# Patient Record
Sex: MALE | Race: OTHER | NOT HISPANIC OR LATINO | URBAN - METROPOLITAN AREA
[De-identification: names, ages, dates, MRNs, and addresses within clinical notes are randomized per-mention and may not be internally consistent; named-entity substitution may affect disease eponyms.]

---

## 2019-01-03 ENCOUNTER — EMERGENCY (EMERGENCY)
Facility: HOSPITAL | Age: 29
LOS: 1 days | Discharge: HOME | End: 2019-01-03
Admitting: EMERGENCY MEDICINE
Payer: COMMERCIAL

## 2019-01-03 VITALS
SYSTOLIC BLOOD PRESSURE: 105 MMHG | OXYGEN SATURATION: 98 % | DIASTOLIC BLOOD PRESSURE: 68 MMHG | WEIGHT: 210.1 LBS | HEART RATE: 88 BPM | RESPIRATION RATE: 18 BRPM | TEMPERATURE: 100 F

## 2019-01-03 VITALS
RESPIRATION RATE: 18 BRPM | SYSTOLIC BLOOD PRESSURE: 104 MMHG | OXYGEN SATURATION: 98 % | HEART RATE: 82 BPM | DIASTOLIC BLOOD PRESSURE: 59 MMHG

## 2019-01-03 PROCEDURE — 70450 CT HEAD/BRAIN W/O DYE: CPT

## 2019-01-03 PROCEDURE — 70450 CT HEAD/BRAIN W/O DYE: CPT | Mod: 26

## 2019-01-03 PROCEDURE — 99284 EMERGENCY DEPT VISIT MOD MDM: CPT

## 2019-01-03 PROCEDURE — 99284 EMERGENCY DEPT VISIT MOD MDM: CPT | Mod: 25

## 2019-01-03 PROCEDURE — 96374 THER/PROPH/DIAG INJ IV PUSH: CPT

## 2019-01-03 PROCEDURE — 96361 HYDRATE IV INFUSION ADD-ON: CPT

## 2019-01-03 PROCEDURE — 96375 TX/PRO/DX INJ NEW DRUG ADDON: CPT

## 2019-01-03 RX ORDER — SODIUM CHLORIDE 9 MG/ML
1000 INJECTION INTRAMUSCULAR; INTRAVENOUS; SUBCUTANEOUS ONCE
Qty: 0 | Refills: 0 | Status: COMPLETED | OUTPATIENT
Start: 2019-01-03 | End: 2019-01-03

## 2019-01-03 RX ORDER — MAGNESIUM SULFATE 500 MG/ML
2 VIAL (ML) INJECTION ONCE
Qty: 0 | Refills: 0 | Status: COMPLETED | OUTPATIENT
Start: 2019-01-03 | End: 2019-01-03

## 2019-01-03 RX ORDER — KETOROLAC TROMETHAMINE 30 MG/ML
30 SYRINGE (ML) INJECTION ONCE
Qty: 0 | Refills: 0 | Status: DISCONTINUED | OUTPATIENT
Start: 2019-01-03 | End: 2019-01-03

## 2019-01-03 RX ORDER — METOCLOPRAMIDE HCL 10 MG
10 TABLET ORAL ONCE
Qty: 0 | Refills: 0 | Status: COMPLETED | OUTPATIENT
Start: 2019-01-03 | End: 2019-01-03

## 2019-01-03 RX ADMIN — Medication 30 MILLIGRAM(S): at 14:36

## 2019-01-03 RX ADMIN — Medication 10 MILLIGRAM(S): at 14:36

## 2019-01-03 RX ADMIN — Medication 50 GRAM(S): at 15:53

## 2019-01-03 RX ADMIN — Medication 30 MILLIGRAM(S): at 15:24

## 2019-01-03 RX ADMIN — SODIUM CHLORIDE 2000 MILLILITER(S): 9 INJECTION INTRAMUSCULAR; INTRAVENOUS; SUBCUTANEOUS at 14:36

## 2019-01-03 RX ADMIN — SODIUM CHLORIDE 1000 MILLILITER(S): 9 INJECTION INTRAMUSCULAR; INTRAVENOUS; SUBCUTANEOUS at 15:24

## 2019-01-03 NOTE — ED PROVIDER NOTE - NSFOLLOWUPINSTRUCTIONS_ED_ALL_ED_FT
Follow up with neurology in one week and follow up with your primary care physician this week. motrin as needed for headaches.

## 2019-01-03 NOTE — ED ADULT NURSE NOTE - OBJECTIVE STATEMENT
Pt w/o significant PMH presents to ED today c/o 8/10 HA since yesterday.  Pt also elicits photophobia.  Pt denies fall or LOC.  Pt is PERRLA, EOMI and w/o neuro deficit on exam.  Pt ambulates w/ steady.  Pt is pending pain relief and eval by [facility].

## 2019-01-03 NOTE — ED ADULT NURSE NOTE - CHPI ED NUR SYMPTOMS NEG
no confusion/no weakness/no loss of consciousness/no dizziness/no fever/no numbness/no vomiting/no blurred vision/no nausea/no change in level of consciousness

## 2019-01-03 NOTE — ED ADULT NURSE NOTE - NSIMPLEMENTINTERV_GEN_ALL_ED
Implemented All Universal Safety Interventions:  [place] to call system. Call bell, personal items and telephone within reach. Instruct patient to call for assistance. Room bathroom lighting operational. Non-slip footwear when patient is off stretcher. Physically safe environment: no spills, clutter or unnecessary equipment. Stretcher in lowest position, wheels locked, appropriate side rails in place.

## 2019-01-03 NOTE — ED PROVIDER NOTE - MEDICAL DECISION MAKING DETAILS
ha x 2months. pt well appearing. VSS. toradol , reglan and fluid given with mild relief. ct scan done and no acute pathology. magnesium given with releif of ha. f/u with neuro.

## 2019-01-03 NOTE — ED PROVIDER NOTE - CARE PROVIDER_API CALL
Carmelita Kong), Neurology; Vascular Neurology  [address], [place] [address]  Phone: [phone]  Fax: [phone]

## 2019-01-03 NOTE — ED PROVIDER NOTE - OBJECTIVE STATEMENT
30 y/o male with no sig PMHx c/o ha x 2 months. pt states intermittent pain for past 2 months and worse past 3 days. Pt states notes pain to frontal and occipital region. no visual changes or photophobia. no neck or back pain. no fever or chills. no n/v/d. no numbness, tingling or weakness. pt has not taken any medications for his HA. no further complaints.

## 2019-01-04 ENCOUNTER — INBOUND DOCUMENT (OUTPATIENT)
Age: 29
End: 2019-01-04

## 2019-01-04 PROBLEM — Z00.00 ENCOUNTER FOR PREVENTIVE HEALTH EXAMINATION: Status: ACTIVE | Noted: 2019-01-04

## 2019-01-06 DIAGNOSIS — R51 HEADACHE: ICD-10-CM

## 2019-10-28 NOTE — ED ADULT TRIAGE NOTE - CADM TRG TX PRIOR TO ARRIVAL
This pt is being referred by Dr Thapa for Tremor/Parkinson's/Movement.  This is an Internal Referral.  Will you see the pt?   none